# Patient Record
Sex: FEMALE | Race: WHITE
[De-identification: names, ages, dates, MRNs, and addresses within clinical notes are randomized per-mention and may not be internally consistent; named-entity substitution may affect disease eponyms.]

---

## 2018-06-18 ENCOUNTER — HOSPITAL ENCOUNTER (INPATIENT)
Dept: HOSPITAL 68 - ERH | Age: 83
LOS: 3 days | DRG: 293 | End: 2018-06-21
Payer: COMMERCIAL

## 2018-06-18 VITALS — DIASTOLIC BLOOD PRESSURE: 90 MMHG | SYSTOLIC BLOOD PRESSURE: 160 MMHG

## 2018-06-18 VITALS — WEIGHT: 95.25 LBS | HEIGHT: 55 IN | BODY MASS INDEX: 22.04 KG/M2

## 2018-06-18 VITALS — SYSTOLIC BLOOD PRESSURE: 142 MMHG | DIASTOLIC BLOOD PRESSURE: 72 MMHG

## 2018-06-18 DIAGNOSIS — D64.9: ICD-10-CM

## 2018-06-18 DIAGNOSIS — R74.0: ICD-10-CM

## 2018-06-18 DIAGNOSIS — E78.5: ICD-10-CM

## 2018-06-18 DIAGNOSIS — I34.0: ICD-10-CM

## 2018-06-18 DIAGNOSIS — I50.33: ICD-10-CM

## 2018-06-18 DIAGNOSIS — I11.0: Primary | ICD-10-CM

## 2018-06-18 LAB
ERYTHROCYTE [DISTWIDTH] IN BLOOD BY AUTOMATED COUNT: 14.4 % (ref 11.5–14.5)
HCT VFR BLD CALC: 33.6 % (ref 37–47)
MCH RBC QN AUTO: 30.8 PG (ref 27–31)
MCHC RBC AUTO-ENTMCNC: 33.3 G/DL (ref 33–37)
MCV RBC AUTO: 92.5 FL (ref 81–99)
PLATELET # BLD: 271 /CUMM (ref 130–400)
PMV BLD AUTO: 9.6 FL (ref 7.4–10.4)
RED BLOOD CELL CT: 3.63 /CUMM (ref 4.2–5.4)
WBC # BLD AUTO: 6.4 /CUMM (ref 4.8–10.8)

## 2018-06-18 NOTE — HISTORY & PHYSICAL
Awadalla MD,Maria M 06/18/18 1439:
General Information and HPI
MD Statement:
I have seen and personally examined SHARMA,VIRGINIA and documented this H&P.
 
The patient is a 89 year old F who presented with a patient stated chief 
complaint of [SOB].
 
Source of Information: patient, family
Exam Limitations: no limitations
History of Present Illness:
 89-year-old female with history of CHF, hypertension, hyperlipidemia, 
presenting the emergency department shortness of breath progressive for 3 days. 
The patient had similar episodes last January when she was in [place] and she 
started developing shortness of breath when she was admitted to the hospital and
was diagnosed with CHF.  Last Wednesday the patient started having shortness of 
breath mainly at night (orthopnea and paroxysmal nocturnal dyspnea) which was 
associated with cough and expectoration of large amount of yellowish sputum.  
She also endorses mild localized mid chest discomfort which lasted only for a 
few minutes.  The patient noticed that her shortness of breath was worse last 
night when she was able to sleep even for a couple of hours.  Patient denies any
recent travel or sick contacts.  She also denies any recent upper respiratory 
infection.
 
Of note the patient had a party last Saturday and she might have increased salt 
intake in her diet.
The patient follow-up with Dr. Krishnamurthy her cardiologist and she does not 
recall any recent changes to her medications
At baseline she is independent and she does not use oxygen at home
 
 
 
 
Allergies/Medications
Allergies:
Coded Allergies:
NO KNOWN ALLERGIES (NONE 06/18/18)
 
Home Med list
Ascorbic Acid (Vitamin C) 500 MG CAPSULE.ER   1 CAP PO DAILY VITAMIN SUPPORT  (
Reported)
Cholecalciferol (Vitamin D3) 1,000 UNIT TABLET   1 TAB PO DAILY VITAMIN SUPPORT 
(Reported)
Ibandronate Sodium (Boniva) 150 MG TABLET   1 TAB PO Q30D BONE  (Reported)
     on the same date with a full glass of water at least 30 minutes before 
first food or drink of the day; remain in an upright posi
Metoprolol Tartrate 25 MG TABLET   1 TAB PO BID HEART  (Reported)
Niacin (Niaspan) 500 MG TAB.ER.24H   1 TAB PO QPM HEART  (Reported)
Omeprazole 20 MG CAPSULE.DR   1 CAP PO DAILY GI  (Reported)
Ubidecarenone (Coq-10) 100 MG CAPSULE   1 CAP PO DAILY SUPPLEMENT  (Reported)
 
 
Past History
 
Travel History
Traveled to [place] past 21 day No
 
Medical History
Neurological: NONE
EENT: NONE
Cardiovascular: CHF, hypertension, hyperlipidemia
Respiratory: NONE
Gastrointestinal: NONE
Hepatic: NONE
Renal: NONE
Musculoskeletal: SCOLIOSIS ARTHRITIS
Psychiatric: NONE
Endocrine: NONE
Blood Disorders: NONE
Cancer(s): NONE
GYN/Reproductive: NONE
 
Surgical History
Surgical History: non-contributory
 
Past Family/Social History
 
Family History
Relations & Conditions if any
Relation not specified for:
  *No pertinent family history
 
 
Review of Systems
 
Review of Systems
Constitutional:
Reports: malaise, weakness. 
Cardiovascular:
Reports: chest pain, orthopena.  Denies: edema, palpitations, peripheral edema. 
Respiratory:
Reports: cough, orthopnea, short of breath, sputum production.  Denies: 
hemoptysis, stridor, wheezing. 
GI:
Denies: no symptoms. 
Genitourinary:
Denies: no symptoms. 
Musculoskeletal:
Denies: no symptoms. 
Skin:
Denies: no symptoms. 
 
Exam & Diagnostic Data
Last 24 Hrs of Vital Signs/I&O
 Vital Signs
 
 
Date Time Temp Pulse Resp B/P B/P Pulse O2 O2 Flow FiO2
 
     Mean Ox Delivery Rate 
 
06/18 1809 98.4 77 16 148/76  98 Nasal 2.0L 
 
       Cannula  
 
06/18 1634 98.6 78 22 146/72  98 Nasal 2.0L 
 
       Cannula  
 
06/18 1432 98.8 78 16 136/67  99 Nasal 2.0L 
 
       Cannula  
 
06/18 1136      95 Room Air  
 
06/18 0931 99.0 75 18 123/74  96 Room Air  
 
 
 Intake & Output
 
 
 06/18 1600 06/18 0800 06/18 0000
 
Intake Total   
 
Output Total 1200  
 
Balance -1200  
 
    
 
Output, Urine 1200  
 
Patient 99 lb 15.99 oz  
 
Weight   
 
Weight Reported by Patient  
 
Measurement   
 
Method   
 
 
 
 
Physical Exam
General Appearance Alert, Oriented X3, Cooperative, No Acute Distress
HEENT Atraumatic, PERRLA, EOMI, Mucous Membr. moist/pink
Neck Supple, JVD
Cardiovascular Normal S1, Normal S2
Lungs Clear to Auscultation
Abdomen Normal Bowel Sounds, Soft, No Tenderness
Neurological Normal Speech, Strength at 5/5 X4 Ext, Normal Tone, Sensation 
Intact, Cranial Nerves 3-12 NL, Reflexes 2+
Extremities No Clubbing, No Cyanosis, No Edema
 
Assessment/Plan
Assessment:
89-year-old female with history of CHF, hypertension, hyperlipidemia, presenting
the emergency department shortness of breath progressive for 3 days.  The 
patient had similar episodes last January when she was in [place] and she 
started developing shortness of breath when she was admitted to the hospital and
was diagnosed with CHF.  Last Wednesday the patient started having shortness of 
breath mainly at night (orthopnea and paroxysmal nocturnal dyspnea)
Patient was diagnosed with CHF back in January and [place], she follow-up with 
Dr. Krishnamurthy, patient is complaining of shortness of breath, orthopnea, cough 
which almost likely due to CHF exacerbation however she denies any lower extreme
swelling.
 
Labs on admission: CBCT showed WBC 6.4, hemoglobin 11.2, MCV 92.5, platelet 271,
BEP shows sodium 145, potassium 3.8, BUN 14, creatinine 0.8, glucose 119, total 
bilirubin 1.7, AST 42, troponin 0 0.03, proBNP 11 800
 
Problem list:
 CHF exacerbation
Normocytic anemia
Hyperbilirubinemia, mild transaminitis
 
 
 
Chest x-ray: Consistent with pulmonary edema
 
Plan:
 Admit to telemetry
Continuous telemetry monitoring
Vitals every shift
Close monitoring of I's and O's
Daily weights
 Lasix 40 twice daily
Cardiology consult appreciated (Dr. Krishnamurthy)
Serial troponin EKG to rule out ACS
Continue home meds
Follow-up on TSH, folic acid, B12,
 
Full code
Heart healthy diet
Full code
 
 
As Ranked By This Provider
Problem List:
 1. CHF exacerbation
 
 
Core Measures/Misc (9/17)
 
Acute Coronary Syndrome
ACS Diagnosis: No
 
Congestive Heart Failure
Congestive Heart Failure Diagnosis Yes
 
Cerebrovascular Accident
CVA/TIA Diagnosis: No
 
VTE (View Protocol)
VTE Risk Factors Age>40
No Mechanical VTE Prophylaxis d/t N/A MechProphylax Ordered
No VTE Pharm Prophylaxis d/t NA PharmProphylax ordered
 
Sepsis (View protocol)
Sepsis Present: No
If YES complete Sepsis Event Note If YES complete Sepsis Event Note
 
 
Nicole Pope 06/18/18 1538:
Core Measures/Misc (9/17)
 
Sepsis (View protocol)
If YES complete Sepsis Event Note If YES complete Sepsis Event Note
 
Attending MD Review Statement
 
Attending Statement
Attending MD Statement: examined this patient, discuss w/resident/PA/NP, agreed 
w/resident/PA/NP, discussed with family, reviewed EMR data (avail), discussed 
with nursing, discussed with case mgmt, reviewed images, amended to note
Attending Assessment/Plan:
89 o/f with pmh of chf presents with worsening shortness of breath for few days 
and requiring iv lasix in ER. PROBNP 97196. Patient is being admitted to 
telemetry monitoring for acute on chronic CHF excaerbation. Obtain serial 
cardiac enzymes, ECHO, cardiology consult, monitor I/o, daily weights. Continue 
with iv diuresis and monitor creatinine in am. gi/dvt prophylaxis
 
 
Chuyita BRYANT,[facility] 06/18/18 1639:
Core Measures/Misc (9/17)
 
Sepsis (View protocol)
If YES complete Sepsis Event Note If YES complete Sepsis Event Note
 
Resident Review Statement
Resident Statement: examined this patient, discussed with intern, agreed with 
intern, discussed with family, reviewed EMR data (avail), discussed with nursing
Other Findings:
Mrs. Sharma 89 year old female with past medical history significant for 
congestive heart failure, hypertension, hyperlipidemia who presented to ED from 
home with chief complaint of shortness of breath.  History was obtained from the
patient and her daughter.  She reported 3 days history of progressive shortness 
of breath mainly at night, orthopnea and paroxysmal nocturnal dyspnea.  She 
reported productive cough of yellow sputum, denied hemoptysis, fever or chills. 
Patient denied any sick contact.  She was admitted in January 2018 to a hospital
in [place] for CHF exacerbation.  Patient used to travel between [place] and 
here.  She lives with her daughter since May 2018.  CHF exacerbation thought to 
be diet induced as patient reported going in [facility] last Sunday where she had 
different salty foods.  Patient denied any lower extremity swelling, palpitation
however reported mild retrosternal chest pain that lasted for a few minutes and 
resolved after taking baby aspirin.  Dr. Dean his cardiologist.
 
Problem list
-CHF mostly right side heart failure
-Normochromic anemia
-Mild transaminitis 
 
Plan
Admit to telemetry floor
Vitals every shift
Strict ins and outs
Lasix 40 twice a day
Cardiac consultation Dr. Krishnamurthy was placed
Echocardiogram
Obtain magnesium, TSH
Folic acid vitamin B12
Hemoccult
Troponins EKG at 9 PM
Repeat CbC and BEP in a.m.
Repeat liver function tests in a.m.
Continue home medication
DVT prophylaxis Lovenox
Diet heart healthy
Code full

## 2018-06-18 NOTE — CONS- CARDIOLOGY
General Information and HPI
 
Consulting Request
Date of Consult: 06/18/18
Requested By:
Nicole Pope MD
 
Reason for Consult:
Heart failure
History of Present Illness:
The patient is an 89-year-old female with history of moderate to severe mitral 
regurgitation and moderate pulmonary hypertension.  She was recently 
hospitalized in January in [place] for congestive heart failure.  She was 
discharged home on oral Lasix, however she did not start the Lasix and was later
told that she did not need it because she was doing well off diuretic therapy.  
She now presents with complaint of worsening shortness of breath for the past 3 
days.  She had a party last week, and she may have had increased sodium intake. 
She notes recent orthopnea and paroxysmal nocturnal dyspnea.  She also notes 
cough productive of yellowish sputum.  No chest pain.  No palpitations.  No 
syncope.  No lightheadedness or dizziness.  No nausea or vomiting.  She has been
started on IV Lasix and she reports that she is already feeling somewhat better.
 
Allergies/Medications
Allergies:
Coded Allergies:
NO KNOWN ALLERGIES (NONE 06/18/18)
 
Home Med List:
Ascorbic Acid (Vitamin C) 500 MG CAPSULE.ER   1 CAP PO DAILY VITAMIN SUPPORT  (
Reported)
Aspirin (Ecotrin*) 81 MG TABLET.DR   2 TAB PO D HEART HEALTH  (Reported)
Atorvastatin Calcium 40 MG TABLET   1 TAB PO QAM HIGH CHOLESTROL  (Reported)
Cholecalciferol (Vitamin D3) 1,000 UNIT TABLET   1 TAB PO DAILY VITAMIN SUPPORT 
(Reported)
Cinnamon Bark (Cinnamon) 500 MG CAPSULE   1 CAP PO D VITAMIN SUPPORT  (Reported)
Ibandronate Sodium (Boniva) 150 MG TABLET   1 TAB PO Q30D BONE  (Reported)
     on the same date with a full glass of water at least 30 minutes before 
first food or drink of the day; remain in an upright posi
Loratadine (Claritin) 10 MG TABLET   1 TAB PO D PRN ALLERGIES  (Reported)
Metoprolol Tartrate 25 MG TABLET   1 TAB PO BID HEART  (Reported)
Niacin (Niaspan) 500 MG TAB.ER.24H   1 TAB PO QPM HEART  (Reported)
Omega-3/Dha/Epa/Fish Oil (Fish Oil 1,000 MG Softgel) 1,000 MG (120 MG-180 MG) 
CAPSULE   1 CAP PO D VITAMIN SUPPORT  (Reported)
Omeprazole 20 MG CAPSULE.DR   1 CAP PO DAILY GI  (Reported)
Ubidecarenone (Coq-10) 100 MG CAPSULE   1 CAP PO DAILY SUPPLEMENT  (Reported)
 
Current Medications:
 Current Medications
 
 
  Sig/Antony Start time  Last
 
Medication Dose Route Stop Time Status Admin
 
Acetaminophen 650 MG Q6P PRN 06/18 1630 AC 
 
  PO   
 
Enoxaparin Sodium 40 MG DAILY 06/19 0900 AC 
 
  SC   
 
Furosemide 40 MG 7:30 AM, & 4:30 PM 06/18 1630 AC 06/18
 
  IV   1838
 
Furosemide 0 .STK-MED ONE 06/18 1245 DC 
 
  IV   
 
Furosemide 40 MG ONCE ONE 06/18 1200 DC 06/18
 
  IV 06/18 1201  1246
 
Metoprolol Tartrate 25 MG BID 06/18 2100 AC 
 
  PO   
 
Niacin 500 MG AT BEDTIME 06/18 2100 AC 
 
  PO   
 
Omeprazole 20 MG DAILY 06/19 0900 AC 
 
  PO   
 
 
 
 
Review of Systems
Review of Systems:
No fever.  No chills.  No rash.  No tremor.  No melena.  All other systems were 
reviewed, and were noted to be negative.
 
Past History
 
Travel History
Traveled to [place] past 21 day No
 
Medical History
Blood Transfusion Hx: No
Neurological: NONE
EENT: allergies, cataracts
Cardiovascular: CHF, hypertension, hyperlipidemia
Respiratory: NONE
Gastrointestinal: GERD
Hepatic: NONE
Renal: NONE
Musculoskeletal: osteoarthritis, SCOLIOSIS
Psychiatric: NONE
Endocrine: NONE
Blood Disorders: NONE
Cancer(s): NONE
GYN/Reproductive: uterine/bladder prolapse
 
Surgical History
Surgical History: DOUBLE HERNIA REPAIR
 
Family History
Relations & Conditions If Any:
Relation not specified for:
  *No pertinent family history
 
 
Psychosocial History
Where Do You Live? Home
Services at Home: None
Smoking Status: Never Smoked
 
Exam & Diagnostic Data
Vital Signs and I&O
Vital Signs
 
 
Date Time Temp Pulse Resp B/P B/P Pulse O2 O2 Flow FiO2
 
     Mean Ox Delivery Rate 
 
06/18 1943 97.9 80 18 142/72  94   
 
06/18 1929       Nasal 2.0L 
 
       Cannula  
 
06/18 1900      98 Nasal 2.0L 
 
       Cannula  
 
06/18 1809 98.4 77 16 148/76  98 Nasal 2.0L 
 
       Cannula  
 
06/18 1634 98.6 78 22 146/72  98 Nasal 2.0L 
 
       Cannula  
 
06/18 1432 98.8 78 16 136/67  99 Nasal 2.0L 
 
       Cannula  
 
06/18 1136      95 Room Air  
 
06/18 0931 99.0 75 18 123/74  96 Room Air  
 
 
 Intake & Output
 
 
 06/18 1600 06/18 0800 06/18 0000 06/17 1600 06/17 0800 06/17 0000
 
Intake Total      
 
Output Total 1200     
 
Balance -1200     
 
       
 
Output, Urine 1200     
 
Patient 99 lb 15.99 oz     
 
Weight      
 
Weight Reported by Patient     
 
Measurement      
 
Method      
 
 
 
Physical Exam:
Gen: The patient is in no acute distress
HEENT: Normal nose, ears, and oropharynx.  Pupils equal bilaterally.  
Conjunctiva normal.
Neck: Supple with no JVD, no masses, and no thyromegaly
Lungs: Bilateral rales with normal respiratory effort
Heart: RRR, S1, S2, 2/6 systolic murmur at the apex.  No peripheral edema, 2+ 
pulses in the lower extremities bilaterally
Abdomen:  Soft, nontender, no masses.  No hepatomegaly.  No splenomegaly
Extremities: No clubbing or cyanosis.  Normal muscle strength in the upper and 
lower extremities
Skin: Normal skin turgor with no skin ulcers or lesions noted.
Neuro: Cranial nerves intact.  Sensation intact
Psych: Alert and oriented x 3 with appropriate affect
 
 
 
Labs/Marcelino Results:
 Laboratory Tests
 
 
 06/18 06/18 06/18 2000 1403 1220
 
Chemistry   
 
  Sodium (137 - 145 mmol/L)  145 
 
  Potassium (3.5 - 5.1 mmol/L)  3.8 
 
  Chloride (98 - 107 mmol/L)  101 
 
  Carbon Dioxide (22 - 30 mmol/L)  27 
 
  Anion Gap (5 - 16)  17  H 
 
  BUN (7 - 17 mg/dL)  14 
 
  Creatinine (0.5 - 1.0 mg/dL)  0.8 
 
  Estimated GFR (>60 ml/min)  > 60 
 
  BUN/Creatinine Ratio (7 - 25 %)  17.5 
 
  Glucose (65 - 99 mg/dL)  119  H 
 
  Lactic Acid (0.7 - 2.1 mmol/L)  1.4 
 
  Calcium (8.4 - 10.2 mg/dL)  9.8 
 
  Total Bilirubin (0.2 - 1.3 mg/dL)  1.7  H 
 
  AST (14 - 36 U/L)  42  H 
 
  ALT (9 - 52 U/L)  40 
 
  Alkaline Phosphatase (<127 U/L)  76 
 
  Troponin I (< 0.11 ng/ml) Pending 0.03 
 
  Pro-B-Natriuretic Pept (<125 pg/mL)  92235  H 
 
  Total Protein (6.3 - 8.2 g/dL)  7.7 
 
  Albumin (3.5 - 5.0 g/dL)  4.0 
 
  Globulin (1.9 - 4.2 gm/dL)  3.7 
 
  Albumin/Globulin Ratio (1.1 - 2.2 %)  1.1 
 
Hematology   
 
  CBC w Diff   MAN DIFF ORDERED
 
  WBC (4.8 - 10.8 /CUMM)   6.4
 
  RBC (4.20 - 5.40 /CUMM)   3.63  L
 
  Hgb (12.0 - 16.0 G/DL)   11.2  L
 
  Hct (37 - 47 %)   33.6  L
 
  MCV (81.0 - 99.0 FL)   92.5
 
  MCH (27.0 - 31.0 PG)   30.8
 
  MCHC (33.0 - 37.0 G/DL)   33.3
 
  RDW (11.5 - 14.5 %)   14.4
 
  Plt Count (130 - 400 /CUMM)   271
 
  MPV (7.4 - 10.4 FL)   9.6
 
  Segmented Neutrophils (42.2 - 75.2 %)   54
 
  Band Neutrophils (0.0 - 5.0 %)   1
 
  Lymphocytes (20.5 - 51.1 %)   24
 
  Monocytes (1.7 - 9.3 %)   15  H
 
  Eosinophils (0 - 5.0 %)   2
 
  Basophils (0.0 - 2.0 %)   3  H
 
  Metamyelocytes (0.0 - 1.0 %)   1
 
  Platelet Estimate (ADEQUATE)     
 
  Polychromasia   1+
 
  Schistocytes   RARE
 
 
 
 
Diagnostic Data
EKG Results
Normal sinus rhythm at 82, left atrial normality, left ventricular hypertrophy, 
intraventricular conduction delay
CXR Results
Findings consistent with pulmonary edema
Other Results
Echocardiogram 6/21/17: Normal LV size and systolic function.  Mild concentric 
LVH.  LVEF 55%.  Moderate to severe mitral regurgitation.  Moderate tricuspid 
regurgitation.  Moderate pulmonary hypertension
 
Assessment/Plan
Assessment/Plan
The patient is an 89-year-old female with history of moderate to severe mitral 
regurgitation presenting with acute diastolic heart failure.  She had a previous
hospital admission in [place] for congestive heart failure and she has been off 
diuretics as an outpatient.  The congestive heart failure is possibly secondary 
to mitral regurgitation.
 
Recommendations:
* I agree with Lasix 40 mg IV every 12 hours
* Monitor input and output with daily weights
* Check basic metabolic profile daily
* Repeat echocardiogram to reevaluate mitral regurgitation and ejection fraction
* If the echocardiogram suggests that the  heart failure is secondary to mitral 
regurgitation, then mitral valve repair could be considered, however given the 
patient's advanced age the surgery would be higher risk
 
 
Consult Acknowledgment
- Thank you for your consult request.

## 2018-06-18 NOTE — RADIOLOGY REPORT
EXAMINATION:
XR CHEST
 
CLINICAL INFORMATION:
CHF
 
COMPARISON:
10/27/2017
 
TECHNIQUE:
2 views of the chest were obtained.
 
FINDINGS:
Reticular markings throughout the lung fields emanate to the heart.
Consistent with pulmonary edema. Possible small effusions are present.
 
IMPRESSION:
Findings consistent with pulmonary edema

## 2018-06-19 VITALS — SYSTOLIC BLOOD PRESSURE: 130 MMHG | DIASTOLIC BLOOD PRESSURE: 74 MMHG

## 2018-06-19 VITALS — DIASTOLIC BLOOD PRESSURE: 74 MMHG | SYSTOLIC BLOOD PRESSURE: 140 MMHG

## 2018-06-19 VITALS — SYSTOLIC BLOOD PRESSURE: 100 MMHG | DIASTOLIC BLOOD PRESSURE: 80 MMHG

## 2018-06-19 LAB
ABSOLUTE GRANULOCYTE CT: 1.9 /CUMM (ref 1.4–6.5)
BASOPHILS # BLD: 0 /CUMM (ref 0–0.2)
BASOPHILS NFR BLD: 0.7 % (ref 0–2)
EOSINOPHIL # BLD: 0.1 /CUMM (ref 0–0.7)
EOSINOPHIL NFR BLD: 2.5 % (ref 0–5)
ERYTHROCYTE [DISTWIDTH] IN BLOOD BY AUTOMATED COUNT: 14.1 % (ref 11.5–14.5)
GRANULOCYTES NFR BLD: 33 % (ref 42.2–75.2)
HCT VFR BLD CALC: 34.3 % (ref 37–47)
LYMPHOCYTES # BLD: 2.2 /CUMM (ref 1.2–3.4)
MCH RBC QN AUTO: 31 PG (ref 27–31)
MCHC RBC AUTO-ENTMCNC: 33.7 G/DL (ref 33–37)
MCV RBC AUTO: 91.9 FL (ref 81–99)
MONOCYTES # BLD: 1.5 /CUMM (ref 0.1–0.6)
PLATELET # BLD: 241 /CUMM (ref 130–400)
PMV BLD AUTO: 9 FL (ref 7.4–10.4)
RED BLOOD CELL CT: 3.74 /CUMM (ref 4.2–5.4)
WBC # BLD AUTO: 5.9 /CUMM (ref 4.8–10.8)

## 2018-06-19 NOTE — PN- CARDIOLOGY
Subjective
Subjective:
Shortness of breath is improving.  No chest pain.  No palpitations.  No 
diaphoresis.  No nausea or vomiting.
 
Objective
Vital Signs and I&Os
Vital Signs
 
 
Date Time Temp Pulse Resp B/P B/P Pulse O2 O2 Flow FiO2
 
     Mean Ox Delivery Rate 
 
06/19 0827  77  140/74     
 
06/19 0712 98.3 77 20 140/74  94   
 
06/18 2354      92 Nasal 1.0L 
 
       Cannula  
 
06/18 2233 97.4 85 20 160/90  93   
 
06/18 2138  83  160/90     
 
06/18 1943 97.9 80 18 142/72  94   
 
06/18 1929       Nasal 2.0L 
 
       Cannula  
 
06/18 1900      98 Nasal 2.0L 
 
       Cannula  
 
06/18 1809 98.4 77 16 148/76  98 Nasal 2.0L 
 
       Cannula  
 
06/18 1634 98.6 78 22 146/72  98 Nasal 2.0L 
 
       Cannula  
 
06/18 1432 98.8 78 16 136/67  99 Nasal 2.0L 
 
       Cannula  
 
 
 Intake & Output
 
 
 06/19 1600 06/19 0800 06/19 0000 06/18 1600 06/18 0800 06/18 0000
 
Intake Total 240     
 
Output Total  1200  
 
Balance -60 -500 -1550 -1200  
 
       
 
Intake, Oral 240     
 
Output, Urine  1200  
 
Patient   92 lb 6 oz 99 lb 15.99 oz  
 
Weight      
 
Weight   Standing Scale Reported by Patient  
 
Measurement      
 
Method      
 
 
 
Physical Exam:
Gen: The patient is in no acute distress
HEENT: Normal nose, ears, and oropharynx.  Pupils equal bilaterally.  
Conjunctiva normal.
Neck: Supple with no JVD, no masses, and no thyromegaly
Lungs: Bilateral rales with normal respiratory effort
Heart: RRR, S1, S2, 2/6 systolic murmur at the apex.  No peripheral edema, 2+ 
pulses in the lower extremities bilaterally
Abdomen:  Soft, nontender, no masses.  No hepatomegaly.  No splenomegaly
Extremities: No clubbing or cyanosis.  Normal muscle strength in the upper and 
lower extremities
Skin: Normal skin turgor with no skin ulcers or lesions noted.
Neuro: Cranial nerves intact.  Sensation intact
Current Medications:
 Current Medications
 
 
  Sig/Antony Start time  Last
 
Medication Dose Route Stop Time Status Admin
 
Acetaminophen 650 MG Q6P PRN 06/18 1630 AC 
 
  PO   
 
Enoxaparin Sodium 40 MG DAILY 06/19 0900 AC 06/19
 
  SC   0826
 
Furosemide 40 MG 7:30 AM, & 4:30 PM 06/18 1630  06/19
 
  IV   0827
 
Metoprolol Tartrate 25 MG BID 06/18 2100 AC 06/19
 
  PO   0827
 
Niacin 500 MG AT BEDTIME 06/18 2100  
 
  PO   
 
Omeprazole 20 MG DAILY 06/19 0900 AC 06/19
 
  PO   0827
 
 
 
 
Results
Last 48 Hrs of Labs/Mics:
 Laboratory Tests
 
06/19/18 0612:
Anion Gap 17  H, Estimated GFR 59  L, BUN/Creatinine Ratio 20.0, Total Bilirubin
1.2, Direct Bilirubin 0.3, AST 34, ALT 34, Alkaline Phosphatase 79, Total 
Protein 7.5, Albumin 4.0, CBC w Diff MAN DIFF ORDERED, RBC 3.74  L, MCV 91.9, 
MCH 31.0, MCHC 33.7, RDW 14.1, MPV 9.0, Gran % 33.0  L, Lymphocytes % 37.8, 
Monocytes % 26.0  H, Eosinophils % 2.5, Basophils % 0.7, Absolute Granulocytes 
1.9, Absolute Lymphocytes 2.2, Absolute Monocytes 1.5  H, Absolute Eosinophils 
0.1, Absolute Basophils 0, Platelet Estimate ADEQUATE, Normocytic RBCs VERIFIED,
Normochromic RBCs VERIFIED
 
06/18/18 2000:
Troponin I 0.03
 
06/18/18 1403:
Anion Gap 17  H, Estimated GFR > 60, BUN/Creatinine Ratio 17.5, Glucose 119  H, 
Lactic Acid 1.4, Calcium 9.8, Total Bilirubin 1.7  H, AST 42  H, ALT 40, 
Alkaline Phosphatase 76, Troponin I 0.03, Pro-B-Natriuretic Pept 24005  H, Total
Protein 7.7, Albumin 4.0, Globulin 3.7, Albumin/Globulin Ratio 1.1
 
06/18/18 1220:
CBC w Diff MAN DIFF ORDERED, RBC 3.63  L, MCV 92.5, MCH 30.8, MCHC 33.3, RDW 
14.4, MPV 9.6, Segmented Neutrophils 54, Band Neutrophils 1, Lymphocytes 24, 
Monocytes 15  H, Eosinophils 2, Basophils 3  H, Metamyelocytes 1, Platelet 
Estimate   , Polychromasia 1+, Schistocytes RARE
 
 
Assessment/Plan
Assessment/Plan
Assessment:
1.  Moderate to severe mitral regurgitation
2.  Acute diastolic heart failure
 
Plan:
* Continue IV Lasix
* Monitor input and output
* Check basic metabolic profile daily
* Echocardiogram pending to reevaluate mitral regurgitation and ejection 
fraction
Continue telemetry? Yes

## 2018-06-19 NOTE — PN- HOUSESTAFF
Awadalla MD,Maria M 18 0717:
Subjective
Follow-up For:
 CHF exacerbation
Normocytic anemia
Hyperbilirubinemia, mild transaminitis
Tele-Events Since Last Visit:
No overnight events
Subjective:
Patient was seen and examined at bedside, she reports market improvement of her 
breathing, denies any chest pain or dizziness or palpitation, currently she is 
off oxygen saturating well
 
Review of Systems
Constitutional:
Reports: see HPI. 
 
Objective
Last 24 Hrs of Vital Signs/I&O
 Vital Signs
 
 
Date Time Temp Pulse Resp B/P B/P Pulse O2 O2 Flow FiO2
 
     Mean Ox Delivery Rate 
 
 0827  77  140/74     
 
 0712 98.3 77 20 140/74  94   
 
 2354      92 Nasal 1.0L 
 
       Cannula  
 
 2233 97.4 85 20 160/90  93   
 
 2138  83  160/90     
 
 1943 97.9 80 18 142/72  94   
 
 1929       Nasal 2.0L 
 
       Cannula  
 
 1900      98 Nasal 2.0L 
 
       Cannula  
 
 1809 98.4 77 16 148/76  98 Nasal 2.0L 
 
       Cannula  
 
 1634 98.6 78 22 146/72  98 Nasal 2.0L 
 
       Cannula  
 
 1432 98.8 78 16 136/67  99 Nasal 2.0L 
 
       Cannula  
 
 1136      95 Room Air  
 
 
 Intake & Output
 
 
  1600  0800  0000
 
Intake Total   
 
Output Total  500 1550
 
Balance  -500 -1550
 
    
 
Output, Urine  500 1550
 
Patient   92 lb 6 oz
 
Weight   
 
Weight   Standing Scale
 
Measurement   
 
Method   
 
 
 
 
Physical Exam
General Appearance: Alert, Oriented X3, Cooperative, No Acute Distress
HEENT: Atraumatic, PERRLA, EOMI, Mucous Membr. moist/pink
Cardiovascular: Normal S1, Normal S2
Lungs: Clear to Auscultation
Abdomen: Normal Bowel Sounds, Soft, No Tenderness
Neurological: Normal Speech, Strength at 5/5 X4 Ext, Normal Tone, Sensation 
Intact, Cranial Nerves 3-12 NL
Extremities: No Clubbing, No Cyanosis, No Edema
 
Assessment/Plan
Assessment:
89-year-old female with history of CHF, hypertension, hyperlipidemia, presenting
the emergency department shortness of breath progressive for 3 days.  The 
patient had similar episodes last January when she was in [place] and she 
started developing shortness of breath when she was admitted to the hospital and
was diagnosed with CHF.  Last Wednesday the patient started having shortness of 
breath mainly at night (orthopnea and paroxysmal nocturnal dyspnea)
Patient was diagnosed with CHF back in January and [place], she follow-up with 
Dr. Krishnamurthy, patient is complaining of shortness of breath, orthopnea, cough 
which almost likely due to CHF exacerbation however she denies any lower 
extremity swelling.
 
Problem list:
 CHF exacerbation
Normocytic anemia
Hyperbilirubinemia, mild transaminitis- improving
 
 
 
Chest x-ray: Consistent with pulmonary edema
 
Plan:
 continue to monitor on  telemetry
Continuous telemetry monitoring
Vitals every shift
Close monitoring of I's and O's
Daily weights
 Continue Lasix 40 twice daily
Cardiology recommendation appreciated (Dr. Krishnamurthy)
Serial troponin EKG ruled out ACS
Continue home meds
Follow-up on TSH, folic acid, B12,
 
Full code
Heart healthy diet
Full code
Problem List:
 1. CHF exacerbation
 
Pain Ratin
Pain Location:
N/A
Pain Goal: Remain pain free
Pain Plan:
[place]
Tomorrow's Labs & Rationales:
Nicole Barnes 18 1128:
Attending MD Review Statement
 
Attending Statement
Attending MD Statement: examined this patient, discuss w/resident/PA/NP, agreed 
w/resident/PA/NP, discussed with family, reviewed EMR data (avail), discussed 
with nursing, discussed with case mgmt, reviewed images, amended to note
Attending Assessment/Plan:
89 o/f with pmh of chf presents with worsening shortness of breath for few days 
and admitted to telemetry monitoring for acute on chronic CHF excaerbation. 
Denies any new complaints. She is feeling better than admission. Negative serial
cardiac enzymes, ECHO pending, cardiology consulted, monitor I/o, daily weights.
Achieve negative balance. Continue with iv diuresis and monitor creatinine. gi/
dvt prophylaxis

## 2018-06-19 NOTE — ECHOCARDIOGRAM REPORT
EDITH [place] 
 
 Age:    89     :    1928      Gender:     F 
 
 MRN:    [id number] 
 
 Exam Date:     2018  
                19:04 
 
 Exam Location: 1  
 [facility] 
 
 Ht (in):     56      Wt (lb):      100     BSA:    1.35 
 
 BP:          146     /     72 
 
 Ordering Physician:        Ravinder Boogie MD 
 
 Referring Physician:       Lokesh Krishnamurthy MD 
 
 Technologist:              [facility] 
 
 Room Number:               185-02 
 
 Indications:       SHORTNESS OF BREATH 
 
 Rhythm:                 Sinus 
 
 Technical Quality:      Good 
 
 FINDINGS 
 
 Left Ventricle 
 Normal size left ventricle. Mildly reduced left ventrricular  
 systolic function.  LVEF estimated at 40-45%.  Mild global  
 hypokinesis. 
 
 Right Ventricle 
 Normal right ventricular size and function. 
 
 Right Atrium 
 Normal right atrial size. 
 
 Left Atrium 
 Normal left atrial size. 
 
 Mitral Valve 
 Mitral valve thickened. Moderate mitral annular calcification.  
 Moderate mitral regurgitation. 
 
 Aortic Valve 
 Diffuse thickening (sclerosis) of the aortic valve cusps without  
 reduced excursion. No aortic stenosis. No aortic regurgitation. 
 
 Tricuspid Valve 
 Tricuspid valve not well visualized, grossly normal. Mild tricuspid  
 regurgitation. 
 
 Pulmonic Valve 
 Pulmonic valve not well visualized, grossly normal. 
 
 Pericardium 
 No pericardial effusion. 
 
 Great Vessels 
 Normal size aortic root. 
 
 CONCLUSIONS 
 Normal size left ventricle. 
 Mildly reduced left ventrricular systolic function. 
 LVEF estimated at 40-45%.  
 Normal size left ventricle. 
 Mild global hypokinesis. 
 Moderate mitral regurgitation. 
 
 Lokesh Krishnamurthy M.D. 
 (Electronically Signed) 
 Final Date:      2018  
                  19:38 
 
 MEASUREMENTS  (Male / Female) Normal Values 
 
 2D ECHO 
 LV Diastolic Diameter PLAX        4.5 cm                4.2 - 5.9 / 3.9 - 5.3 
cm 
 LV Systolic Diameter PLAX         3.1 cm                2.1 - 4.0 cm 
 LV Fractional Shortening PLAX     31.1 %                25 - 46  % 
 LV Ejection Fraction 2D Teich     59.0 %                 
 IVS Diastolic Thickness           1.1 cm                 
 LVPW Diastolic Thickness          1.1 cm                 
 LV Relative Wall Thickness        0.5                    
 RV Internal Dim ED PLAX           2.3 cm                1.9 - 3.8 cm 
 LVOT Diameter                     2.1 cm                 
 Aortic Root Diameter              2.9 cm                 
 LA Systolic Diameter LX           4.0 cm                3.0 - 4.0 / 2.7 - 3.8 
cm 
 LV Ejection Fraction MOD BP       46.0 %                >= 55  % 
 LV Diastolic Length 4C            6.5 cm                6.9 - 10.3 cm 
 LV Diastolic Area 4C              22.7 cm               
 LV Diastolic Volume MOD 4C        63.0 cm               
 LV Ejection Fraction MOD 4C       41.3 %                 
 LV Stroke Volume MOD 4C           26.0 cm               
 LV Systolic Length 4C             6.1 cm                 
 LV Systolic Area 4C               16.6 cm               
 LV Systolic Volume MOD 4C         37.0 cm               
 LV Ejection Fraction MOD 2C       44.1 %                 
 LV Diastolic Volume 4C AL         66.9 cm              85 - 139 / 69 - 109 cm
 
 LV Systolic Volume 4C AL          38.4 cm               
 LV Ejection Fraction 4C AL        42.6 %                 
 LV Stroke Volume 4C AL            28.5 cm               
 LV Ejection Fraction 2C AL        47.2 %                 
 LA Volume                         47.0 cm              18 - 58 / 22 - 52 cm 
 Ascending Aorta Diameter          3.1 cm                 
 
 DOPPLER 
 AV Peak Velocity                  142.0 cm/s             
 AV Peak Gradient                  8.1 mmHg               
 AV Mean Velocity                  90.5 cm/s              
 AV Mean Gradient                  4.0 mmHg               
 AV Velocity Time Integral         26.9 cm                
 LVOT Peak Velocity                104.0 cm/s             
 LVOT Peak Gradient                4.3 mmHg               
 LVOT Mean Velocity                62.8 cm/s              
 LVOT Mean Gradient                2.0 mmHg               
 LVOT Velocity Time Integral       17.2 cm                
 LVOT Stroke Volume                59.6 cm               
 AV Area Cont Eq vti               2.2 cm                
 AV Area Cont Eq pk                2.5 cm                
 MV Peak Velocity                  136.0 cm/s             
 MV Peak Gradient                  7.4 mmHg               
 MV Mean Velocity                  73.4 cm/s              
 MV Mean Gradient                  3.0 mmHg               
 Mitral E Point Velocity           65.6 cm/s              
 Mitral A Point Velocity           124.0 cm/s             
 Mitral E to A Ratio               0.5                    
 MV PHT Velocity                   73.0 cm/s              
 MV Deceleration [place]             299.0 cm/s            
 MV Pressure Half Time             73.2 ms                
 MV Area PHT                       3.0 cm                
 MV Deceleration Time              114.0 ms               
 MR Peak Velocity                  554.0 cm/s             
 MR Peak Gradient                  122.8 mmHg             
 MR ERO PISA                       0.3 cm                
 MR Regurgitant Volume PISA        58.3 cm               
 TR Peak Velocity                  289.0 cm/s             
 TR Peak Gradient                  33.4 mmHg              
 Right Atrial Pressure             10.0 mmHg              
 Pulmonary Artery Systolic Pressu  43.4 mmHg              
 Right Ventricular Systolic Press  43.4 mmHg              
 PV Peak Velocity                  97.9 cm/s              
 PV Peak Gradient                  3.8 mmHg               
 PV Mean Velocity                  72.0 cm/s              
 PV Mean Gradient                  2.0 mmHg               
 PV Velocity Time Integral         18.1 cm                
 LV E' Lateral Velocity            4.7 cm/s               
 Mitral E to LV E' Lateral Ratio   14.0                   
 LV E' Septal Velocity             2.3 cm/s               
 Mitral E to LV E' Septal Ratio    28.1

## 2018-06-20 VITALS — SYSTOLIC BLOOD PRESSURE: 98 MMHG | DIASTOLIC BLOOD PRESSURE: 54 MMHG

## 2018-06-20 VITALS — SYSTOLIC BLOOD PRESSURE: 130 MMHG | DIASTOLIC BLOOD PRESSURE: 50 MMHG

## 2018-06-20 VITALS — SYSTOLIC BLOOD PRESSURE: 120 MMHG | DIASTOLIC BLOOD PRESSURE: 78 MMHG

## 2018-06-20 VITALS — SYSTOLIC BLOOD PRESSURE: 114 MMHG | DIASTOLIC BLOOD PRESSURE: 53 MMHG

## 2018-06-20 NOTE — PN- CARDIOLOGY
Subjective
Subjective:
The patient appears to be doing somewhat better today.  Right arm IV no longer 
functional.  Her respiratory status has improved.  Not yet at baseline per the 
patient.
 
Objective
Vital Signs and I&Os
Vital Signs
 
 
Date Time Temp Pulse Resp B/P B/P Pulse O2 O2 Flow FiO2
 
     Mean Ox Delivery Rate 
 
06/20 0954  85  120/78     
 
06/20 0709   18   93 Room Air  
 
06/20 0623 97.9 85 24 120/78  89   
 
06/20 0000       Room Air  
 
06/19 2204 97.6 75 24 130/74  97   
 
06/19 2043  78  100/80     
 
06/19 1425 96.8 78 18 100/80  96 Room Air  
 
 
 Intake & Output
 
 
 06/20 1600 06/20 0800 06/20 0000 06/19 1600 06/19 0800 06/19 0000
 
Intake Total  120  440  
 
Output Total  750 250 
 
Balance  -630 -250 140 -500 -1550
 
       
 
Intake, Oral  120  440  
 
Output, Urine  750 250 
 
Patient   95 lb 9 oz   92 lb 6 oz
 
Weight      
 
Weight      Standing Scale
 
Measurement      
 
Method      
 
 
 
Physical Exam:
General Appearance: well developed/nourished, alert, awake, oriented
Head: normal
HEENT: Normal
Neck: supple, JVP normal, carotid upstrokes normal bilaterally, no masses or 
thyromegaly
Respiratory: chest non-tender, clear to auscultation and percussion bilaterally
Cardiovascular: regular rate/rhythm, normal S1, S2, 2/6 systolic murmur left 
lower sternal border/apex
Abdomen: normal bowel sounds, soft, non-tender
Extremities: normal inspection, no edema
Vascular: Pulses are 2+ and equal bilaterally
Neurologic: Grossly normal/nonfocal
Current Medications:
 Current Medications
 
 
  Sig/Antony Start time  Last
 
Medication Dose Route Stop Time Status Admin
 
Acetaminophen 650 MG Q6P PRN 06/18 1630 AC 
 
  PO   
 
Enoxaparin Sodium 40 MG DAILY 06/19 0900 AC 06/20
 
  SC   0954
 
Furosemide 40 MG DAILY 06/21 0900 AC 
 
  IV   
 
Furosemide 40 MG DAILY 06/20 0900 CAN 
 
  IV   
 
Furosemide 40 MG 7:30 AM, & 4:30 PM 06/18 1630 DC 06/20
 
  IV   0725
 
Metoprolol Tartrate 25 MG BID 06/18 2100 AC 06/20
 
  PO   0954
 
Niacin 500 MG AT BEDTIME 06/18 2100 AC 06/19
 
  PO   2043
 
Omeprazole 20 MG DAILY 06/19 0900 AC 06/20
 
  PO   0954
 
Patient Medication  1 ED ONE ONE 06/19 1630 [facility]  ED 06/19 1631  
 
 
 
 
Results
Last 48 Hrs of Labs/Mics:
 Laboratory Tests
 
06/20/18 0617:
Anion Gap 14, Estimated GFR 47  L, BUN/Creatinine Ratio 28.2  H
 
06/19/18 0612:
Anion Gap 17  H, Estimated GFR 59  L, BUN/Creatinine Ratio 20.0, Magnesium 2.0, 
Total Bilirubin 1.2, Direct Bilirubin 0.3, AST 34, ALT 34, Alkaline Phosphatase 
79, Total Protein 7.5, Albumin 4.0, CBC w Diff MAN DIFF ORDERED, RBC 3.74  L, 
MCV 91.9, MCH 31.0, MCHC 33.7, RDW 14.1, MPV 9.0, Gran % 33.0  L, Lymphocytes % 
37.8, Monocytes % 26.0  H, Eosinophils % 2.5, Basophils % 0.7, Absolute 
Granulocytes 1.9, Absolute Lymphocytes 2.2, Absolute Monocytes 1.5  H, Absolute 
Eosinophils 0.1, Absolute Basophils 0, Platelet Estimate ADEQUATE, Normocytic 
RBCs VERIFIED, Normochromic RBCs VERIFIED
 
06/18/18 2000:
Troponin I 0.03
 
06/18/18 1403:
Anion Gap 17  H, Estimated GFR > 60, BUN/Creatinine Ratio 17.5, Glucose 119  H, 
Lactic Acid 1.4, Calcium 9.8, Total Bilirubin 1.7  H, AST 42  H, ALT 40, 
Alkaline Phosphatase 76, Troponin I 0.03, Pro-B-Natriuretic Pept 38510  H, Total
Protein 7.7, Albumin 4.0, Globulin 3.7, Albumin/Globulin Ratio 1.1
 
06/18/18 1220:
CBC w Diff MAN DIFF ORDERED, RBC 3.63  L, MCV 92.5, MCH 30.8, MCHC 33.3, RDW 
14.4, MPV 9.6, Segmented Neutrophils 54, Band Neutrophils 1, Lymphocytes 24, 
Monocytes 15  H, Eosinophils 2, Basophils 3  H, Metamyelocytes 1, Platelet 
Estimate   , Polychromasia 1+, Schistocytes RARE
 
 
Assessment/Plan
Assessment/Plan
Assessment:
1.  Moderate to severe mitral regurgitation
2.  Acute diastolic heart failure
3.  Mild anemia
4.  Mild acute renal insufficiency
 
Plan:
-Continue IV Lasix with transition to oral Lasix over the next 24 hour with 
further dose adjustment depending on renal function
-Monitor input and output
-Check basic metabolic profile in AM; note mild worsening of renal function
-Echocardiogram reportedly shows moderate mitral insufficiency with a left 
ventricular ejection fraction of approximately 40-45%.
Continue telemetry? Yes

## 2018-06-20 NOTE — PN- HOUSESTAFF
Awadalla MD,Maria M 18 0727:
Subjective
Follow-up For:
CHF exacerbation
Normocytic anemia
Hyperbilirubinemia, mild transaminitisimproved
Tele-Events Since Last Visit:
Normal sinus rhythm, 7279, SVT up to 160 once
Subjective:
Patient was seen and examined, denies any complaint, saturating well on room air
, stable vital signs, negative fluid balance of -610
 
Review of Systems
Constitutional:
Reports: see HPI. 
 
Objective
Last 24 Hrs of Vital Signs/I&O
 Vital Signs
 
 
Date Time Temp Pulse Resp B/P B/P Pulse O2 O2 Flow FiO2
 
     Mean Ox Delivery Rate 
 
 1449 97.5 74 16 114/53  96 Room Air  
 
 0954  85  120/78     
 
 0709   18   93 Room Air  
 
 0623 97.9 85 24 120/78  89   
 
 0000       Room Air  
 
 2204 97.6 75 24 130/74  97   
 
 2043  78  100/80     
 
 
 Intake & Output
 
 
  1600  0800  0000
 
Intake Total  120 
 
Output Total 600 750 250
 
Balance -600 -630 -250
 
    
 
Intake, Oral  120 
 
Number 1  
 
Bowel   
 
Movements   
 
Output, Urine 600 750 250
 
Patient   95 lb 9 oz
 
Weight   
 
 
 
 
Physical Exam
General Appearance: Alert, Oriented X3, Cooperative
Cardiovascular: Normal S1, Normal S2
Lungs: Clear to Auscultation
Abdomen: Normal Bowel Sounds, Soft, No Tenderness
Extremities: No Clubbing, No Cyanosis, No Edema
 
Assessment/Plan
Assessment:
89-year-old female with history of CHF, hypertension, hyperlipidemia, presenting
the emergency department shortness of breath progressive for 3 days.  The 
patient had similar episodes last January when she was in [place] and she 
started developing shortness of breath when she was admitted to the hospital and
was diagnosed with CHF.  Last Wednesday the patient started having shortness of 
breath mainly at night (orthopnea and paroxysmal nocturnal dyspnea)
Patient was diagnosed with CHF back in January and [place], she follow-up with 
Dr. Krishnamurthy, patient is complaining of shortness of breath, orthopnea, cough 
which almost likely due to CHF exacerbation however she denies any lower 
extremity swelling.
 
Problem list:
 CHF exacerbation
Normocytic anemia
Hyperbilirubinemia, mild transaminitis- improving
 
 
 
Chest x-ray: Consistent with pulmonary edema
 
Plan:
 continue to monitor on  telemetry
Continuous telemetry monitoring
Vitals every shift
Close monitoring of I's and O's
Daily weights
 Continue Lasix 40 twice daily, will switch her to p.o. tomorrow
Cardiology recommendation appreciated (Dr. Krishnamurthy)
Serial troponin EKG ruled out ACS
Continue home meds
 
Full code
Heart healthy diet
Full code
 
Problem List:
 1. CHF exacerbation
 
Pain Ratin
Pain Location:
N/a
Pain Goal: Remain pain free
Pain Plan:
[place]
Tomorrow's Labs & Rationales:
[place]
 
 
Nicole Pope 18 1115:
Attending MD Review Statement
 
Attending Statement
Attending MD Statement: examined this patient, discuss w/resident/PA/NP, agreed 
w/resident/PA/NP, discussed with family, reviewed EMR data (avail), discussed 
with nursing, discussed with case mgmt, reviewed images, amended to note
Attending Assessment/Plan:
89 o/f with pmh of chf presents with worsening shortness of breath for few days 
and admitted to telemetry monitoring for acute on chronic CHF excaerbation. 
Denies any new complaints. She is feeling better than yesterday. ECHO with 
systolci heart failure, cardiology f/u, Negative close to 1l, Continue with iv 
diuresis and transition to PO in next 24 hrs and monitor creatinine. Anticipate 
dc in next 24 hrs.

## 2018-06-20 NOTE — PATIENT DISCHARGE INSTRUCTIONS
Discharge Instructions
 
General Discharge Information
You were seen/treated for:
CHF exacerbation
Special Instructions:
1please follow-up with your PCP in 1 week of discharge
2please follow-up with your cardiologist in 1 week of discharge
3please take medications as advised
 
Diet
Continue normal diet: No
Recommended Diet: Heart Healthy
 
Acute Coronary Syndrome
 
Inclusion Criteria
At DC or during hospital stay patient has or had the following:
ACS DIAGNOSIS No
 
Discharge Core Measures
Meds if any: Prescribed or Continued at Discharge
Meds if any: NOT Prescribed or Continued at Discharge
 
Congestive Heart Failure
 
Inclusion Criteria
At DC or during hospital stay patient has or had the following:
CHF DIAGNOSIS Yes
 
Discharge Core Measures
Meds if any: Prescribed or Continued at Discharge
ACE/ARB for EF <40% No
Meds if any: NOT Prescribed or Continued at Discharge
No ACE/ARB d/t Medical Contraindication (blood pressure running low)
 
Cerebrovascular accident
 
Inclusion Criteria
At DC or during hospital stay patient has or had the following:
CVA/TIA Diagnosis No
 
Discharge Core Measures
Meds if any: Prescribed or Continued at Discharge
Meds if any: NOT Prescribed or Continued at Discharge
 
Venous thromboembolism
 
Inclusion Criteria
VTE Diagnosis No
VTE Type NONE
VTE Confirmed by (Test) NONE
 
Discharge Core Measures
- Per Current guidelines, there needs to be overlap
- treatment for the first 5 days of Warfarin therapy.
- If discharged on Warfarin prior to 5 days of
- overlap therapy, the patient will need to be
- assessed for post discharge needs including
- *Post discharge parental anticoagulation
- *Warfarin and/or parental anticoagulation education
- *Follow up date to check INR post discharge
At least 5 days overlap therapy as Inpatient No
Meds if any: Prescribed or Continued at Discharge
Note: Overlap Therapy is Warfarin and Anticoagulant
Meds if any: NOT Prescribed or Continued at Discharge

## 2018-06-21 VITALS — DIASTOLIC BLOOD PRESSURE: 70 MMHG | SYSTOLIC BLOOD PRESSURE: 110 MMHG

## 2018-06-21 VITALS — SYSTOLIC BLOOD PRESSURE: 110 MMHG | DIASTOLIC BLOOD PRESSURE: 70 MMHG

## 2018-06-21 NOTE — DISCHARGE SUMMARY
Visit Information
 
Visit Dates
Admission Date:
06/18/18
 
Discharge Date:
06/21/18
 
 
Hospital Course
 
Course
Attending Physician:
Nicole Pope MD
 
Primary Care Physician:
Geo Colon MD
 
Hospital Course:
 89-year-old female with history of CHF, hypertension, hyperlipidemia, 
presenting the emergency department shortness of breath progressive for 3 days. 
The patient had similar episodes last January when she was in [place] and she 
started developing shortness of breath when she was admitted to the hospital and
was diagnosed with CHF.  Last Wednesday the patient started having shortness of 
breath mainly at night (orthopnea and paroxysmal nocturnal dyspnea) which was 
associated with cough and expectoration of large amount of yellowish sputum.
 
Patient was diagnosed with CHF back in January and [place], she follow-up with 
Dr. Krishnamurthy, patient is complaining of shortness of breath, orthopnea, cough 
which almost likely due to CHF exacerbation however she denies any lower extreme
swelling.
 
Labs on admission: CBCT showed WBC 6.4, hemoglobin 11.2, MCV 92.5, platelet 271,
BEP shows sodium 145, potassium 3.8, BUN 14, creatinine 0.8, glucose 119, total 
bilirubin 1.7, AST 42, troponin 0 0.03, proBNP 11 800
 
ECHO:
Normal size left ventricle. 
 Mildly reduced left ventrricular systolic function. 
 LVEF estimated at 40-45%.  
 Normal size left ventricle. 
 Mild global hypokinesis. 
 Moderate mitral regurgitation. 
 
Chest x-ray: Consistent with pulmonary edema
 
She was admitted to [facility] for treatment of the following Problems
 
 
 
# CHF exacerbation
-Monitored by Continuous telemetry monitoring, Vitals every shift, Close 
monitoring of I's and O's, Daily weights, She was treated with Lasix 40 twice 
daily, patient was diuresing well, her symptoms improved, Lasix was switched to 
40 mg p.o. daily which the patient was discharged on. Serial troponiN and EKG 
were negative which ruled out ACS. Patient was followed up by her cardiologist 
Dr. Krishnamurthy.  She was kept on her home medication.
 
Chronic normocytic anemia-stable
Hyperbilirubinemia, mild transaminitis-improved
 
Full code
Heart healthy diet
Full code
 
 
Allergies:
Coded Allergies:
NO KNOWN ALLERGIES (NONE 06/18/18)
 
 
Disposition Summary
 
Disposition
Principal Diagnosis:
CHF exacerbation
Additional Diagnosis:
Chronic normocytic anemia
Discharge Disposition: home or self care
 
Discharge Instructions
 
General Discharge Information
Code Status: Full Code
Patient's Diet:
CONSISTENT CARBOHYDRATE
Patient's Activity:
AS TOLERATED
Follow-Up Instructions/Appts:
1please follow-up with your PCP in 1 week of discharge
2please follow-up with your cardiologist in 1 week of discharge
3please take medications as advised
 
Medications at Discharge
Discharge Medications:
Continue taking these medications:
Omeprazole (Omeprazole) 20 MG CAPSULE.DR
    1 Capsule ORAL DAILY
 
Metoprolol Tartrate (Metoprolol Tartrate) 25 MG TABLET
    1 Tablet ORAL TWICE DAILY
    Qty = 180
    Comments:
       Last Taken: 6/21 
             Time: 0900
 
Ibandronate Sodium (Boniva) 150 MG TABLET
    1 Tablet ORAL ONCE A MONTH
    Qty = 3
    Instructions:
       on the same date with a full glass of water at least 30 minutes before 
first food or drink of the day; remain in an upright posi
 
Niacin (Niaspan) 500 MG TAB.ER.24H
    1 Tablet ORAL Every night
    Qty = 30
 
Ascorbic Acid (Vitamin C) 500 MG CAPSULE.ER
    1 Capsule ORAL DAILY
 
Ubidecarenone (Coq-10) 100 MG CAPSULE
    1 Capsule ORAL DAILY
 
Cholecalciferol (Vitamin D3) 1,000 UNIT TABLET
    1 Tablet ORAL DAILY
 
Atorvastatin Calcium (Atorvastatin Calcium) 40 MG TABLET
    1 Tablet ORAL Every Morning
    Qty = 30
 
Omega-3/Dha/Epa/Fish Oil (Fish Oil 1,000 MG Softgel) 1,000 MG (120 MG-180 MG) 
CAPSULE
    1 Capsule ORAL Every Day
 
Cinnamon Bark (Cinnamon) 500 MG CAPSULE
    1 Capsule ORAL Every Day
 
Loratadine (Claritin) 10 MG TABLET
    1 Tablet ORAL Every Day as needed for ALLERGIES
 
Aspirin (Ecotrin*) 81 MG TABLET.
    2 Tablet ORAL Every Day
 
Start taking the following new medications:
Furosemide (Lasix) 40 MG TABLET
    1 Tablet ORAL DAILY
    Qty = 30
    No Refills
    Instructions:
       .
    Comments:
       Last Taken: 6/21
             Time: 0900 
 
 
Copies To:
Isaiah BRYANT,Geo DONNELLY
 
Attending MD Review Statement
Documenting Attending:
Nicole Pope MD
Other Findings:
Patient clinically much better than admission. She can be dishcarged home on PO 
duretics. She needs to follow up with PCP and Cardiology Dr Krishnamurthy as 
outpatient in few weeks of discharge.

## 2018-06-21 NOTE — PN- HOUSESTAFF
Awadalla MD,Maria M 18 [id number]:
Subjective
Follow-up For:
CHF exacerbation
Normocytic anemia
Hyperbilirubinemia, mild transaminitisimproved
Tele-Events Since Last Visit:
Normal sinus rhythm, 7279, no overnight events
Subjective:
Patient was seen and examined, diuresing well, stable vital signs, denies any 
complaint
 
Review of Systems
Constitutional:
Reports: see HPI. 
 
Objective
Last 24 Hrs of Vital Signs/I&O
 Vital Signs
 
 
Date Time Temp Pulse Resp B/P B/P Pulse O2 O2 Flow FiO2
 
     Mean Ox Delivery Rate 
 
 0920  88  110/70     
 
 0658 98.3 88 18 110/70  98 Room Air  
 
 2203 97.6 76 16 130/50  93   
 
 2154 97.7 75 18 98/54  97 Room Air  
 
 2150  84  98/54     
 
 1449 97.5 74 16 114/53  96 Room Air  
 
 
 Intake & Output
 
 
  1600  0800  0000
 
Intake Total  220 350
 
Output Total 200 900 700
 
Balance -200 -680 -350
 
    
 
Intake, Oral  220 350
 
Output, Urine 200 900 700
 
Patient   95 lb 4 oz
 
Weight   
 
Weight   Bed scale
 
Measurement   
 
Method   
 
 
 
 
Physical Exam
General Appearance: Alert, Oriented X3, Cooperative, No Acute Distress
Neck: Supple, No JVD
Cardiovascular: Normal S1, Normal S2
Lungs: Clear to Auscultation
Abdomen: Normal Bowel Sounds, Soft, No Tenderness
Extremities: No Edema
 
Assessment/Plan
Assessment:
89-year-old female with history of CHF, hypertension, hyperlipidemia, presenting
the emergency department shortness of breath progressive for 3 days.  The 
patient had similar episodes last January when she was in [place] and she 
started developing shortness of breath when she was admitted to the hospital and
was diagnosed with CHF.  Last Wednesday the patient started having shortness of 
breath mainly at night (orthopnea and paroxysmal nocturnal dyspnea)
Patient was diagnosed with CHF back in January and [place], she follow-up with 
Dr. Krishnamurthy, patient is complaining of shortness of breath, orthopnea, cough 
which almost likely due to CHF exacerbation however she denies any lower 
extremity swelling.
 
Problem list:
 CHF exacerbation
Normocytic anemia
Hyperbilirubinemia, mild transaminitis- improving
 
 
 
Chest x-ray: Consistent with pulmonary edema
 
Plan:
 continue to monitor on  telemetry
Continuous telemetry monitoring
Vitals every shift
Close monitoring of I's and O's
Daily weights
DC IV Lasix
Start p.o. Lasix 40 mg daily
Cardiology recommendation appreciated (Dr. Krishnamurthy)
Serial troponin EKG ruled out ACS
Continue home meds
 
Patient is a stable to be discharged home today to follow-up with her 
cardiologist as an outpatient
 
Full code
Heart healthy diet
Full code
Problem List:
 1. CHF exacerbation
 
Pain Ratin
Pain Location:
N/A
Pain Goal: Remain pain free
Pain Plan:
Pathway
Tomorrow's Labs & Rationales:
N/A
 
 
Nicole Pope 18 1134:
Attending MD Review Statement
 
Attending Statement
Attending MD Statement: examined this patient, discuss w/resident/PA/NP, agreed 
w/resident/PA/NP, discussed with family, reviewed EMR data (avail), discussed 
with nursing, discussed with case mgmt, reviewed images, amended to note
Attending Assessment/Plan:
Patient clinically much better than admission. She can be dishcarged home on PO 
duretics. She needs to follow up with PCP and Cardiology Dr Krishnamurthy as 
outpatient in few weeks of discharge.

## 2018-06-21 NOTE — PN- CARDIOLOGY
Subjective
Subjective:
The patient reports that she is feeling better.  Shortness of breath is 
improved.  No chest pain.  No palpitations.  No nausea or vomiting.
 
Objective
Vital Signs and I&Os
Vital Signs
 
 
Date Time Temp Pulse Resp B/P B/P Pulse O2 O2 Flow FiO2
 
     Mean Ox Delivery Rate 
 
06/21 0920  88  110/70     
 
06/21 0658 98.3 88 18 110/70  98 Room Air  
 
06/20 2203 97.6 76 16 130/50  93   
 
06/20 2154 97.7 75 18 98/54  97 Room Air  
 
06/20 2150  84  98/54     
 
06/20 1449 97.5 74 16 114/53  96 Room Air  
 
 
 Intake & Output
 
 
 06/21 1600 06/21 0800 06/21 0000 06/20 1600 06/20 0800 06/20 0000
 
Intake Total  220 350  120 
 
Output Total 200 900 700 600 750 250
 
Balance -200 -680 -350 -600 -630 -250
 
       
 
Intake, Oral  220 350  120 
 
Number    1  
 
Bowel      
 
Movements      
 
Output, Urine 200 900 700 600 750 250
 
Patient   95 lb 4 oz   95 lb 9 oz
 
Weight      
 
Weight   Bed scale   
 
Measurement      
 
Method      
 
 
 
Physical Exam:
Gen: The patient is in no acute distress
HEENT: Normal nose, ears, and oropharynx.  Pupils equal bilaterally.  
Conjunctiva normal.
Neck: Supple with no JVD, no masses, and no thyromegaly
Lungs: Few bilateral rales with normal respiratory effort
Heart: RRR, S1, S2, 2/6 systolic murmur at the apex.  No peripheral edema, 2+ 
pulses in the lower extremities bilaterally
Abdomen:  Soft, nontender, no masses.  No hepatomegaly.  No splenomegaly
Extremities: No clubbing or cyanosis.  Normal muscle strength in the upper and 
lower extremities
Skin: Normal skin turgor with no skin ulcers or lesions noted.
Neuro: Cranial nerves intact.  Sensation intact
Current Medications:
 Current Medications
 
 
  Sig/Antony Start time  Last
 
Medication Dose Route Stop Time Status Admin
 
Acetaminophen 650 MG Q6P PRN 06/18 1630 DCD 
 
  PO   
 
Enoxaparin Sodium 40 MG DAILY 06/19 0900 DCD 06/21
 
  SC   0920
 
Furosemide 40 MG 7:30 AM, & 4:30 PM 06/21 1630 DC 
 
  PO   
 
Furosemide 40 MG DAILY 06/21 0900 CAN 
 
  IV   
 
Furosemide 40 MG DAILY 06/21 0900 DCD 06/21
 
  PO   0934
 
Metoprolol Tartrate 25 MG BID 06/18 2100 DCD 06/21
 
  PO   0920
 
Niacin 500 MG AT BEDTIME 06/18 2100 DCD 06/20
 
  PO   2149
 
Omeprazole 20 MG DAILY 06/19 0900 DCD 06/21
 
  PO   0920
 
Polyethylene Glycol 17 GM DAILY 06/20 1340 DCD 06/21
 
  PO   0920
 
Senna/Docusate Sodium 1 TAB BID PRN 06/20 1345 DCD 
 
  PO   
 
 
 
 
Results
Last 48 Hrs of Labs/Mics:
 Laboratory Tests
 
06/21/18 0622:
Anion Gap 13, Estimated GFR 47  L, BUN/Creatinine Ratio 26.4  H
 
06/20/18 0617:
Anion Gap 14, Estimated GFR 47  L, BUN/Creatinine Ratio 28.2  H
 
Recent Imaging Studies:
  Echocardiogram June 19, 2018:
 Normal size left ventricle. 
 Mildly reduced left ventrricular systolic function. 
 LVEF estimated at 40-45%.  
 Normal size left ventricle. 
 Mild global hypokinesis. 
 Moderate mitral regurgitation. 
 
Assessment/Plan
Assessment/Plan
Assessment:
1.  Moderate to severe mitral regurgitation
2.  Acute diastolic heart failure
3.  Mild anemia
4.  Mild acute renal insufficiency
 
Plan:
  The patient is planned for discharge today.
  Oral Lasix will be continued.
  Follow up in the office in 1 week.
 
Continue telemetry? No